# Patient Record
Sex: MALE | Race: WHITE | NOT HISPANIC OR LATINO | Employment: FULL TIME | URBAN - METROPOLITAN AREA
[De-identification: names, ages, dates, MRNs, and addresses within clinical notes are randomized per-mention and may not be internally consistent; named-entity substitution may affect disease eponyms.]

---

## 2018-08-17 ENCOUNTER — OFFICE VISIT (OUTPATIENT)
Dept: INTERNAL MEDICINE | Facility: CLINIC | Age: 28
End: 2018-08-17
Payer: OTHER GOVERNMENT

## 2018-08-17 VITALS
SYSTOLIC BLOOD PRESSURE: 136 MMHG | HEIGHT: 68 IN | DIASTOLIC BLOOD PRESSURE: 80 MMHG | BODY MASS INDEX: 25.11 KG/M2 | WEIGHT: 165.69 LBS | TEMPERATURE: 97 F | RESPIRATION RATE: 16 BRPM | HEART RATE: 68 BPM

## 2018-08-17 DIAGNOSIS — Z00.00 ROUTINE GENERAL MEDICAL EXAMINATION AT A HEALTH CARE FACILITY: Primary | ICD-10-CM

## 2018-08-17 DIAGNOSIS — J45.40 MODERATE PERSISTENT ASTHMA, UNSPECIFIED WHETHER COMPLICATED: ICD-10-CM

## 2018-08-17 DIAGNOSIS — J30.81 ALLERGIC RHINITIS DUE TO ANIMAL HAIR AND DANDER: ICD-10-CM

## 2018-08-17 PROCEDURE — 99999 PR PBB SHADOW E&M-NEW PATIENT-LVL III: CPT | Mod: PBBFAC,,, | Performed by: INTERNAL MEDICINE

## 2018-08-17 PROCEDURE — 99203 OFFICE O/P NEW LOW 30 MIN: CPT | Mod: PBBFAC,PN | Performed by: INTERNAL MEDICINE

## 2018-08-17 PROCEDURE — 99385 PREV VISIT NEW AGE 18-39: CPT | Mod: S$PBB,,, | Performed by: INTERNAL MEDICINE

## 2018-08-17 RX ORDER — DEXAMETHASONE 4 MG/1
TABLET ORAL
Refills: 0 | COMMUNITY
Start: 2018-06-16 | End: 2018-08-17 | Stop reason: SDUPTHER

## 2018-08-17 RX ORDER — DIPHENHYDRAMINE HCL 25 MG
25 TABLET ORAL NIGHTLY PRN
COMMUNITY

## 2018-08-17 RX ORDER — FLUTICASONE PROPIONATE 50 MCG
SPRAY, SUSPENSION (ML) NASAL
Refills: 12 | COMMUNITY
Start: 2018-06-16 | End: 2018-08-17 | Stop reason: SDUPTHER

## 2018-08-17 RX ORDER — FLUTICASONE PROPIONATE 50 MCG
SPRAY, SUSPENSION (ML) NASAL
Qty: 3 BOTTLE | Refills: 3 | Status: SHIPPED | OUTPATIENT
Start: 2018-08-17 | End: 2018-11-02 | Stop reason: SDUPTHER

## 2018-08-17 RX ORDER — ASPIRIN 500 MG
500 TABLET, DELAYED RELEASE (ENTERIC COATED) ORAL EVERY 6 HOURS PRN
COMMUNITY

## 2018-08-17 RX ORDER — ALBUTEROL SULFATE 90 UG/1
AEROSOL, METERED RESPIRATORY (INHALATION)
COMMUNITY
Start: 2018-06-09 | End: 2018-08-17 | Stop reason: SDUPTHER

## 2018-08-17 RX ORDER — ALBUTEROL SULFATE 90 UG/1
2 AEROSOL, METERED RESPIRATORY (INHALATION) EVERY 6 HOURS PRN
Qty: 18 G | Refills: 3 | Status: SHIPPED | OUTPATIENT
Start: 2018-08-17 | End: 2018-11-02 | Stop reason: SDUPTHER

## 2018-08-17 RX ORDER — DEXAMETHASONE 4 MG/1
TABLET ORAL
Qty: 36 G | Refills: 3 | Status: SHIPPED | OUTPATIENT
Start: 2018-08-17 | End: 2018-10-22 | Stop reason: SDUPTHER

## 2018-08-17 NOTE — PROGRESS NOTES
Subjective:       Patient ID: Aria Jacobo is a 28 y.o. male.    Chief Complaint: Establish Care    HPI  Pt establishing care.  Has a hx of asthma exac by animaldander.  Rare use of rescue inhaler.  Got immunizations thru [employer].  Review of Systems   All other systems reviewed and are negative.      Objective:      Physical Exam   Constitutional: He appears well-developed. No distress.   HENT:   Head: Normocephalic.   Eyes: EOM are normal. No scleral icterus.   Neck: Normal range of motion. No tracheal deviation present.   Cardiovascular: Normal rate, regular rhythm, normal heart sounds and intact distal pulses.   Pulmonary/Chest: Effort normal and breath sounds normal. No respiratory distress.   Abdominal: Soft. Bowel sounds are normal. He exhibits no distension. There is no tenderness.   Musculoskeletal: Normal range of motion. He exhibits no edema.   Neurological: He is alert.   Skin: Skin is warm and dry. No rash noted. He is not diaphoretic. No erythema.   Psychiatric: He has a normal mood and affect. His behavior is normal.   Vitals reviewed.      Assessment:       1. Routine general medical examination at a health care facility    2. Moderate persistent asthma, unspecified whether complicated    3. Allergic rhinitis due to animal hair and dander        Plan:       Aria was seen today for establish care.    Diagnoses and all orders for this visit:    Routine general medical examination at a health care facility    Moderate persistent asthma, unspecified whether complicated  -     PROAIR HFA 90 mcg/actuation inhaler; Inhale 2 puffs into the lungs every 6 (six) hours as needed for Wheezing.  -     FLOVENT  mcg/actuation inhaler; INL 2 TO 3 PFS PO BID    Allergic rhinitis due to animal hair and dander  -     fluticasone (FLONASE) 50 mcg/actuation nasal spray; 2 PUFFS INTO NOSTRILS QD      Follow-up if symptoms worsen or fail to improve.

## 2018-10-22 DIAGNOSIS — J45.40 MODERATE PERSISTENT ASTHMA, UNSPECIFIED WHETHER COMPLICATED: ICD-10-CM

## 2018-10-23 RX ORDER — DEXAMETHASONE 4 MG/1
TABLET ORAL
Qty: 36 G | Refills: 3 | Status: SHIPPED | OUTPATIENT
Start: 2018-10-23 | End: 2018-10-25 | Stop reason: SDUPTHER

## 2018-10-25 DIAGNOSIS — J45.40 MODERATE PERSISTENT ASTHMA, UNSPECIFIED WHETHER COMPLICATED: ICD-10-CM

## 2018-10-25 RX ORDER — DEXAMETHASONE 4 MG/1
TABLET ORAL
Qty: 36 G | Refills: 3 | Status: SHIPPED | OUTPATIENT
Start: 2018-10-25 | End: 2018-11-02 | Stop reason: SDUPTHER

## 2018-10-26 ENCOUNTER — TELEPHONE (OUTPATIENT)
Dept: INTERNAL MEDICINE | Facility: CLINIC | Age: 28
End: 2018-10-26

## 2018-10-26 NOTE — TELEPHONE ENCOUNTER
----- Message from Halima Alston sent at 10/26/2018 10:43 AM CDT -----  Contact: [place] request  ----- Message from [employer], System Message sent at 10/26/2018  9:44 AM CDT -----    Appointment Request From: Aria Jacobo    With Provider: Manan Carrasquillo MD [[facility] - Internal Medicine]    Preferred Date Range: 11/2/2018 - 11/7/2018    Preferred Times: Any time    Reason for visit: Deployment readiness    Comments:  Need to exchange medical info, need pre-deployment list accomplished

## 2018-11-02 ENCOUNTER — OFFICE VISIT (OUTPATIENT)
Dept: INTERNAL MEDICINE | Facility: CLINIC | Age: 28
End: 2018-11-02
Payer: OTHER GOVERNMENT

## 2018-11-02 VITALS
DIASTOLIC BLOOD PRESSURE: 80 MMHG | TEMPERATURE: 97 F | WEIGHT: 170.88 LBS | SYSTOLIC BLOOD PRESSURE: 118 MMHG | HEART RATE: 81 BPM | HEIGHT: 69 IN | OXYGEN SATURATION: 97 % | BODY MASS INDEX: 25.31 KG/M2

## 2018-11-02 DIAGNOSIS — J45.40 MODERATE PERSISTENT ASTHMA, UNSPECIFIED WHETHER COMPLICATED: ICD-10-CM

## 2018-11-02 DIAGNOSIS — J30.81 ALLERGIC RHINITIS DUE TO ANIMAL HAIR AND DANDER: ICD-10-CM

## 2018-11-02 PROCEDURE — 99214 OFFICE O/P EST MOD 30 MIN: CPT | Mod: PBBFAC,PN | Performed by: INTERNAL MEDICINE

## 2018-11-02 PROCEDURE — 99213 OFFICE O/P EST LOW 20 MIN: CPT | Mod: S$PBB,,, | Performed by: INTERNAL MEDICINE

## 2018-11-02 PROCEDURE — 99999 PR PBB SHADOW E&M-EST. PATIENT-LVL IV: CPT | Mod: PBBFAC,,, | Performed by: INTERNAL MEDICINE

## 2018-11-02 RX ORDER — ALBUTEROL SULFATE 90 UG/1
2 AEROSOL, METERED RESPIRATORY (INHALATION) EVERY 6 HOURS PRN
Qty: 18 G | Refills: 3 | Status: SHIPPED | OUTPATIENT
Start: 2018-11-02 | End: 2020-03-11 | Stop reason: SDUPTHER

## 2018-11-02 RX ORDER — DEXAMETHASONE 4 MG/1
TABLET ORAL
Qty: 36 G | Refills: 3 | Status: SHIPPED | OUTPATIENT
Start: 2018-11-02 | End: 2018-11-09 | Stop reason: SDUPTHER

## 2018-11-02 RX ORDER — FLUTICASONE PROPIONATE 50 MCG
SPRAY, SUSPENSION (ML) NASAL
Qty: 3 BOTTLE | Refills: 3 | Status: SHIPPED | OUTPATIENT
Start: 2018-11-02 | End: 2019-07-03 | Stop reason: SDUPTHER

## 2018-11-02 NOTE — PROGRESS NOTES
Subjective:       Patient ID: Aria Jacobo is a 28 y.o. male.    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  No asthma flare in almost 1 year.  Review of Systems    Objective:      Physical Exam   Constitutional: He appears well-developed. No distress.   HENT:   Head: Normocephalic.   Eyes: EOM are normal. No scleral icterus.   Neck: Normal range of motion. No tracheal deviation present.   Cardiovascular: Normal rate, regular rhythm, normal heart sounds and intact distal pulses.   Pulmonary/Chest: Effort normal and breath sounds normal. No respiratory distress.   Abdominal: He exhibits no distension.   Musculoskeletal: Normal range of motion. He exhibits no edema.   Neurological: He is alert.   Skin: Skin is warm and dry. No rash noted. He is not diaphoretic. No erythema.   Psychiatric: He has a normal mood and affect. His behavior is normal.   Vitals reviewed.      Assessment:       1. Moderate persistent asthma, unspecified whether complicated    2. Allergic rhinitis due to animal hair and dander        Plan:       Aria was seen today for follow-up.    Diagnoses and all orders for this visit:    Moderate persistent asthma, unspecified whether complicated  -     PROAIR HFA 90 mcg/actuation inhaler; Inhale 2 puffs into the lungs every 6 (six) hours as needed for Wheezing.  -     FLOVENT  mcg/actuation inhaler; INL 2 TO 3 PFS PO BID    Allergic rhinitis due to animal hair and dander  -     fluticasone (FLONASE) 50 mcg/actuation nasal spray; 2 PUFFS INTO NOSTRILS QD      Follow-up if symptoms worsen or fail to improve.

## 2018-11-09 DIAGNOSIS — J45.40 MODERATE PERSISTENT ASTHMA, UNSPECIFIED WHETHER COMPLICATED: ICD-10-CM

## 2018-11-09 RX ORDER — DEXAMETHASONE 4 MG/1
TABLET ORAL
Qty: 36 G | Refills: 3 | Status: SHIPPED | OUTPATIENT
Start: 2018-11-09 | End: 2018-11-09 | Stop reason: SDUPTHER

## 2018-11-09 NOTE — TELEPHONE ENCOUNTER
----- Message from Gloria Valdovinos sent at 11/9/2018  3:19 PM CST -----  Haven't  Received his Flovent from [facility] yet and he is out of his Flovent . Want to know can he get a 30 day supply for Flovent ( emergency script). Please send to [facility] in [place].

## 2018-11-12 ENCOUNTER — TELEPHONE (OUTPATIENT)
Dept: INTERNAL MEDICINE | Facility: CLINIC | Age: 28
End: 2018-11-12

## 2018-11-12 DIAGNOSIS — Z00.00 ROUTINE GENERAL MEDICAL EXAMINATION AT A HEALTH CARE FACILITY: Primary | ICD-10-CM

## 2018-11-12 RX ORDER — DEXAMETHASONE 4 MG/1
TABLET ORAL
Qty: 36 G | Refills: 3 | Status: SHIPPED | OUTPATIENT
Start: 2018-11-12 | End: 2020-05-12 | Stop reason: SDUPTHER

## 2018-11-12 NOTE — TELEPHONE ENCOUNTER
----- Message from Santiago Hutchinson sent at 11/12/2018  3:45 PM CST -----  Contact: [phone]/self  Patient states he's returning your call   Please call and advise

## 2018-11-14 ENCOUNTER — CLINICAL SUPPORT (OUTPATIENT)
Dept: INTERNAL MEDICINE | Facility: CLINIC | Age: 28
End: 2018-11-14
Payer: OTHER GOVERNMENT

## 2018-11-14 DIAGNOSIS — Z00.00 ROUTINE GENERAL MEDICAL EXAMINATION AT A HEALTH CARE FACILITY: ICD-10-CM

## 2018-11-14 PROCEDURE — 94010 BREATHING CAPACITY TEST: CPT | Mod: PBBFAC

## 2018-11-14 PROCEDURE — 94010 BREATHING CAPACITY TEST: CPT | Mod: 26,S$PBB,, | Performed by: INTERNAL MEDICINE

## 2018-11-15 NOTE — PROGRESS NOTES
Aria Jacobo presents on 11/14/18 for pulmonary function test. Completed with no problems. Dr Carrasquillo reviewed and results were scanned into the chart.

## 2018-11-21 ENCOUNTER — TELEPHONE (OUTPATIENT)
Dept: INTERNAL MEDICINE | Facility: CLINIC | Age: 28
End: 2018-11-21

## 2018-11-21 NOTE — TELEPHONE ENCOUNTER
Form emailed to [email or web address]      ----- Message from Danyelle Toussaint sent at 11/21/2018  8:59 AM CST -----  Contact: Self [phone]  Patient is calling to see if his paper work has been faxed. Please advice

## 2018-11-27 ENCOUNTER — TELEPHONE (OUTPATIENT)
Dept: INTERNAL MEDICINE | Facility: CLINIC | Age: 28
End: 2018-11-27

## 2018-11-27 NOTE — TELEPHONE ENCOUNTER
Spoke with patient and he is calling to see if paperwork was emailed. Informed will talk with staff and call back.

## 2018-11-27 NOTE — TELEPHONE ENCOUNTER
----- Message from Maria Jensen sent at 11/27/2018  9:50 AM CST -----  Contact: [phone]/ self   Pt its requesting to speak with the nurse in regarding his paper work for [employer]. Please advise

## 2019-07-03 DIAGNOSIS — J30.81 ALLERGIC RHINITIS DUE TO ANIMAL HAIR AND DANDER: ICD-10-CM

## 2019-07-05 ENCOUNTER — TELEPHONE (OUTPATIENT)
Dept: FAMILY MEDICINE | Facility: CLINIC | Age: 29
End: 2019-07-05

## 2019-07-05 DIAGNOSIS — J30.81 ALLERGIC RHINITIS DUE TO ANIMAL HAIR AND DANDER: ICD-10-CM

## 2019-07-05 RX ORDER — FLUTICASONE PROPIONATE 50 MCG
SPRAY, SUSPENSION (ML) NASAL
Qty: 3 BOTTLE | Refills: 3 | Status: SHIPPED | OUTPATIENT
Start: 2019-07-05 | End: 2019-07-06 | Stop reason: SDUPTHER

## 2019-07-05 RX ORDER — FLUTICASONE PROPIONATE 50 MCG
SPRAY, SUSPENSION (ML) NASAL
Qty: 3 BOTTLE | Refills: 3 | Status: SHIPPED | OUTPATIENT
Start: 2019-07-05 | End: 2019-07-05 | Stop reason: SDUPTHER

## 2019-07-06 DIAGNOSIS — J30.81 ALLERGIC RHINITIS DUE TO ANIMAL HAIR AND DANDER: ICD-10-CM

## 2019-07-06 RX ORDER — FLUTICASONE PROPIONATE 50 MCG
SPRAY, SUSPENSION (ML) NASAL
Qty: 3 BOTTLE | Refills: 3 | Status: SHIPPED | OUTPATIENT
Start: 2019-07-06 | End: 2020-12-17

## 2019-07-09 ENCOUNTER — PATIENT MESSAGE (OUTPATIENT)
Dept: INTERNAL MEDICINE | Facility: CLINIC | Age: 29
End: 2019-07-09

## 2019-07-10 DIAGNOSIS — J30.81 ALLERGIC RHINITIS DUE TO ANIMAL HAIR AND DANDER: ICD-10-CM

## 2019-09-10 ENCOUNTER — OFFICE VISIT (OUTPATIENT)
Dept: INTERNAL MEDICINE | Facility: CLINIC | Age: 29
End: 2019-09-10
Payer: OTHER GOVERNMENT

## 2019-09-10 VITALS — OXYGEN SATURATION: 96 % | WEIGHT: 168.88 LBS | HEIGHT: 69 IN | HEART RATE: 72 BPM | BODY MASS INDEX: 25.01 KG/M2

## 2019-09-10 DIAGNOSIS — Z00.00 ROUTINE GENERAL MEDICAL EXAMINATION AT A HEALTH CARE FACILITY: Primary | ICD-10-CM

## 2019-09-10 DIAGNOSIS — J45.40 MODERATE PERSISTENT ASTHMA, UNSPECIFIED WHETHER COMPLICATED: ICD-10-CM

## 2019-09-10 PROCEDURE — 99213 OFFICE O/P EST LOW 20 MIN: CPT | Mod: PBBFAC,PN | Performed by: INTERNAL MEDICINE

## 2019-09-10 PROCEDURE — 99999 PR PBB SHADOW E&M-EST. PATIENT-LVL III: CPT | Mod: PBBFAC,,, | Performed by: INTERNAL MEDICINE

## 2019-09-10 PROCEDURE — 99999 PR PBB SHADOW E&M-EST. PATIENT-LVL III: ICD-10-PCS | Mod: PBBFAC,,, | Performed by: INTERNAL MEDICINE

## 2019-09-10 PROCEDURE — 99395 PR PREVENTIVE VISIT,EST,18-39: ICD-10-PCS | Mod: S$PBB,,, | Performed by: INTERNAL MEDICINE

## 2019-09-10 PROCEDURE — 99395 PREV VISIT EST AGE 18-39: CPT | Mod: S$PBB,,, | Performed by: INTERNAL MEDICINE

## 2019-09-10 NOTE — PROGRESS NOTES
Subjective:       Patient ID: Aria Jacobo is a 29 y.o. male.    Chief Complaint: Follow-up    HPI  Wellness check.  Chart reviewed.  Pt needs asthma documentation for [employer].  No recent flairs, last use of MDI 1 month ago due to animal dander.  No exercise induced sxs.  Review of Systems   Constitutional: Negative for activity change and unexpected weight change.   HENT: Negative for hearing loss, rhinorrhea and trouble swallowing.    Eyes: Negative for discharge and visual disturbance.   Respiratory: Negative for chest tightness and wheezing.    Cardiovascular: Negative for chest pain and palpitations.   Gastrointestinal: Negative for blood in stool, constipation, diarrhea and vomiting.   Endocrine: Negative for polydipsia and polyuria.   Genitourinary: Negative for difficulty urinating, hematuria and urgency.   Musculoskeletal: Negative for arthralgias, joint swelling and neck pain.   Neurological: Negative for weakness and headaches.   Psychiatric/Behavioral: Negative for confusion and dysphoric mood.   All other systems reviewed and are negative.      Objective:      Physical Exam   Constitutional: He appears well-developed.   HENT:   Head: Normocephalic.   Mouth/Throat: Oropharynx is clear and moist.   Eyes: EOM are normal.   Neck: Normal range of motion.   Cardiovascular: Normal rate, regular rhythm, normal heart sounds and intact distal pulses.   Pulmonary/Chest: Effort normal and breath sounds normal. He has no wheezes.   Abdominal: Bowel sounds are normal.   Musculoskeletal: Normal range of motion.   Neurological: He is alert.   Skin: Skin is warm and dry.   Psychiatric: He has a normal mood and affect.   Vitals reviewed.      Assessment:       1. Routine general medical examination at a health care facility    2. Moderate persistent asthma, unspecified whether complicated        Plan:       Aira was seen today for follow-up.    Diagnoses and all orders for this visit:    Routine general medical  examination at a health care facility    Moderate persistent asthma, unspecified whether complicated  -     Primary Care Spirometry w/o Bronchodilator  -     Complete PFT with bronchodilator; Future      Follow up if symptoms worsen or fail to improve.

## 2019-09-12 ENCOUNTER — TELEPHONE (OUTPATIENT)
Dept: INTERNAL MEDICINE | Facility: CLINIC | Age: 29
End: 2019-09-12

## 2019-09-19 ENCOUNTER — HOSPITAL ENCOUNTER (OUTPATIENT)
Dept: PULMONOLOGY | Facility: HOSPITAL | Age: 29
Discharge: HOME | End: 2019-09-19
Attending: INTERNAL MEDICINE
Payer: OTHER GOVERNMENT

## 2019-09-19 DIAGNOSIS — J45.40 MODERATE PERSISTENT ASTHMA, UNSPECIFIED WHETHER COMPLICATED: ICD-10-CM

## 2019-09-19 PROCEDURE — 94640 AIRWAY INHALATION TREATMENT: CPT | Mod: 59

## 2019-09-19 PROCEDURE — 94729 DIFFUSING CAPACITY: CPT

## 2019-09-19 PROCEDURE — 94010 BREATHING CAPACITY TEST: CPT

## 2019-09-19 PROCEDURE — 94726 PLETHYSMOGRAPHY LUNG VOLUMES: CPT

## 2019-09-23 LAB
BRPFT: ABNORMAL
DLCO ADJ PRE: 33.07 ML/(MIN*MMHG) (ref 27.21–41.06)
DLCO SINGLE BREATH LLN: 27.21
DLCO SINGLE BREATH PRE REF: 96.9 %
DLCO SINGLE BREATH REF: 34.14
DLCOC SBVA LLN: 3.64
DLCOC SBVA PRE REF: 110 %
DLCOC SBVA REF: 4.95
DLCOC SINGLE BREATH LLN: 27.21
DLCOC SINGLE BREATH PRE REF: 96.9 %
DLCOC SINGLE BREATH REF: 34.14
DLCOVA LLN: 3.64
DLCOVA PRE REF: 110 %
DLCOVA PRE: 5.44 ML/(MIN*MMHG*L) (ref 3.64–6.25)
DLCOVA REF: 4.95
DLVAADJ PRE: 5.44 ML/(MIN*MMHG*L) (ref 3.64–6.25)
ERV LLN: 1.55
ERV PRE REF: 37.4 %
ERV REF: 1.55
FEF 25 75 CHG: 29.6 %
FEF 25 75 LLN: 2.76
FEF 25 75 POST REF: 42.9 %
FEF 25 75 PRE REF: 33.1 %
FEF 25 75 REF: 4.44
FET100 CHG: 27 %
FEV1 CHG: 25.3 %
FEV1 FVC CHG: 11.5 %
FEV1 FVC LLN: 72
FEV1 FVC POST REF: 77.1 %
FEV1 FVC PRE REF: 69.2 %
FEV1 FVC REF: 83
FEV1 LLN: 3.46
FEV1 POST REF: 71.7 %
FEV1 PRE REF: 57.3 %
FEV1 REF: 4.33
FRCPLETH LLN: 2.29
FRCPLETH PREREF: 113.6 %
FRCPLETH REF: 3.27
FVC CHG: 12.4 %
FVC LLN: 4.22
FVC POST REF: 92.6 %
FVC PRE REF: 82.4 %
FVC REF: 5.24
IVC PRE: 4.53 L (ref 4.22–6.26)
IVC SINGLE BREATH LLN: 4.22
IVC SINGLE BREATH PRE REF: 86.3 %
IVC SINGLE BREATH REF: 5.24
PEF CHG: 20.2 %
PEF LLN: 7.76
PEF POST REF: 96.4 %
PEF PRE REF: 80.2 %
PEF REF: 10.01
POST FEF 25 75: 1.91 L/S (ref 2.76–6.12)
POST FET 100: 7.05 SEC
POST FEV1 FVC: 63.98 % (ref 72.01–93.95)
POST FEV1: 3.11 L (ref 3.46–5.2)
POST FVC: 4.86 L (ref 4.22–6.26)
POST PEF: 9.65 L/S (ref 7.76–12.25)
PRE DLCO: 33.07 ML/(MIN*MMHG) (ref 27.21–41.06)
PRE ERV: 0.58 L (ref 1.55–1.55)
PRE FEF 25 75: 1.47 L/S (ref 2.76–6.12)
PRE FET 100: 5.55 SEC
PRE FEV1 FVC: 57.41 % (ref 72.01–93.95)
PRE FEV1: 2.48 L (ref 3.46–5.2)
PRE FRC PL: 3.72 L
PRE FVC: 4.32 L (ref 4.22–6.26)
PRE PEF: 8.03 L/S (ref 7.76–12.25)
PRE RV: 1.92 L (ref 1.05–2.4)
PRE TLC: 6.69 L (ref 5.75–8.05)
RAW LLN: 3.06
RAW PRE REF: 93.6 %
RAW PRE: 2.86 CMH2O*S/L (ref 3.06–3.06)
RAW REF: 3.06
RV LLN: 1.05
RV PRE REF: 111.5 %
RV REF: 1.72
RVTLC LLN: 17
RVTLC PRE REF: 111.8 %
RVTLC PRE: 28.7 % (ref 16.68–34.64)
RVTLC REF: 26
TLC LLN: 5.75
TLC PRE REF: 96.9 %
TLC REF: 6.9
VA PRE: 6.08 L (ref 6.75–6.75)
VA SINGLE BREATH LLN: 6.75
VA SINGLE BREATH PRE REF: 90 %
VA SINGLE BREATH REF: 6.75
VC LLN: 4.22
VC PRE REF: 83.4 %
VC PRE: 4.37 L (ref 4.22–6.26)
VC REF: 5.24
VTGRAWPRE: 4.67 L

## 2019-09-27 ENCOUNTER — TELEPHONE (OUTPATIENT)
Dept: FAMILY MEDICINE | Facility: CLINIC | Age: 29
End: 2019-09-27

## 2019-09-27 ENCOUNTER — PATIENT MESSAGE (OUTPATIENT)
Dept: INTERNAL MEDICINE | Facility: CLINIC | Age: 29
End: 2019-09-27

## 2019-09-27 ENCOUNTER — HOSPITAL ENCOUNTER (OUTPATIENT)
Dept: PULMONOLOGY | Facility: HOSPITAL | Age: 29
Discharge: HOME | End: 2019-09-27
Attending: INTERNAL MEDICINE
Payer: OTHER GOVERNMENT

## 2019-09-27 DIAGNOSIS — J45.40 MODERATE PERSISTENT ASTHMA, UNSPECIFIED WHETHER COMPLICATED: Primary | ICD-10-CM

## 2019-09-27 DIAGNOSIS — J45.40 MODERATE PERSISTENT ASTHMA, UNSPECIFIED WHETHER COMPLICATED: ICD-10-CM

## 2019-09-27 PROCEDURE — 94010 BREATHING CAPACITY TEST: CPT

## 2019-09-27 PROCEDURE — 94726 PLETHYSMOGRAPHY LUNG VOLUMES: CPT

## 2019-09-27 PROCEDURE — 94640 AIRWAY INHALATION TREATMENT: CPT

## 2019-09-27 PROCEDURE — 94729 DIFFUSING CAPACITY: CPT

## 2019-09-30 LAB
BRPFT: ABNORMAL
BRPFT: ABNORMAL
DLCO ADJ PRE: 33.6 ML/(MIN*MMHG) (ref 27.21–41.06)
DLCO SINGLE BREATH LLN: 27.21
DLCO SINGLE BREATH PRE REF: 98.4 %
DLCO SINGLE BREATH REF: 34.14
DLCOC SBVA LLN: 3.64
DLCOC SBVA PRE REF: 108.9 %
DLCOC SBVA REF: 4.95
DLCOC SINGLE BREATH LLN: 27.21
DLCOC SINGLE BREATH PRE REF: 98.4 %
DLCOC SINGLE BREATH REF: 34.14
DLCOVA LLN: 3.64
DLCOVA PRE REF: 108.9 %
DLCOVA PRE: 5.39 ML/(MIN*MMHG*L) (ref 3.64–6.25)
DLCOVA REF: 4.95
DLVAADJ PRE: 5.39 ML/(MIN*MMHG*L) (ref 3.64–6.25)
ERV LLN: 1.55
ERV PRE REF: 81 %
ERV REF: 1.55
FEF 25 75 CHG: -8.6 %
FEF 25 75 LLN: 2.76
FEF 25 75 POST REF: 39.2 %
FEF 25 75 PRE REF: 42.9 %
FEF 25 75 REF: 4.44
FET100 CHG: 0 %
FEV1 CHG: 0.1 %
FEV1 FVC CHG: -6.6 %
FEV1 FVC LLN: 72
FEV1 FVC POST REF: 75.8 %
FEV1 FVC PRE REF: 81.1 %
FEV1 FVC REF: 83
FEV1 LLN: 3.46
FEV1 POST REF: 70.7 %
FEV1 PRE REF: 70.6 %
FEV1 REF: 4.33
FRCPLETH LLN: 2.29
FRCPLETH PREREF: 105 %
FRCPLETH REF: 3.27
FVC CHG: 7.2 %
FVC LLN: 4.22
FVC POST REF: 92.9 %
FVC PRE REF: 86.7 %
FVC REF: 5.24
IVC PRE: 4.64 L (ref 4.22–6.26)
IVC SINGLE BREATH LLN: 4.22
IVC SINGLE BREATH PRE REF: 88.5 %
IVC SINGLE BREATH REF: 5.24
PEF CHG: 2.8 %
PEF LLN: 7.76
PEF POST REF: 97.5 %
PEF PRE REF: 94.8 %
PEF REF: 10.01
POST FEF 25 75: 1.74 L/S (ref 2.76–6.12)
POST FET 100: 7.42 SEC
POST FEV1 FVC: 62.87 % (ref 72.01–93.94)
POST FEV1: 3.06 L (ref 3.46–5.2)
POST FVC: 4.87 L (ref 4.22–6.26)
POST PEF: 9.75 L/S (ref 7.76–12.25)
PRE DLCO: 33.6 ML/(MIN*MMHG) (ref 27.21–41.06)
PRE ERV: 1.26 L (ref 1.55–1.55)
PRE FEF 25 75: 1.91 L/S (ref 2.76–6.12)
PRE FET 100: 7.42 SEC
PRE FEV1 FVC: 67.28 % (ref 72.01–93.94)
PRE FEV1: 3.06 L (ref 3.46–5.2)
PRE FRC PL: 3.44 L
PRE FVC: 4.54 L (ref 4.22–6.26)
PRE PEF: 9.49 L/S (ref 7.76–12.25)
PRE RV: 1.73 L (ref 1.05–2.4)
PRE TLC: 6.33 L (ref 5.75–8.05)
RAW LLN: 3.06
RAW PRE REF: 74.1 %
RAW PRE: 2.27 CMH2O*S/L (ref 3.06–3.06)
RAW REF: 3.06
RV LLN: 1.05
RV PRE REF: 100.3 %
RV REF: 1.72
RVTLC LLN: 17
RVTLC PRE REF: 106.4 %
RVTLC PRE: 27.29 % (ref 16.68–34.64)
RVTLC REF: 26
TLC LLN: 5.75
TLC PRE REF: 91.7 %
TLC REF: 6.9
VA PRE: 6.25 L (ref 6.75–6.75)
VA SINGLE BREATH LLN: 6.75
VA SINGLE BREATH PRE REF: 92.6 %
VA SINGLE BREATH REF: 6.75
VC LLN: 4.22
VC PRE REF: 87.8 %
VC PRE: 4.6 L (ref 4.22–6.26)
VC REF: 5.24
VTGRAWPRE: 4.07 L

## 2019-10-04 ENCOUNTER — HOSPITAL ENCOUNTER (OUTPATIENT)
Dept: PULMONOLOGY | Facility: HOSPITAL | Age: 29
Discharge: HOME | End: 2019-10-04
Attending: INTERNAL MEDICINE
Payer: OTHER GOVERNMENT

## 2019-10-04 DIAGNOSIS — J45.40 MODERATE PERSISTENT ASTHMA, UNSPECIFIED WHETHER COMPLICATED: ICD-10-CM

## 2019-10-04 PROCEDURE — 94010 BREATHING CAPACITY TEST: CPT

## 2019-10-04 PROCEDURE — 94729 DIFFUSING CAPACITY: CPT

## 2019-10-04 PROCEDURE — 94726 PLETHYSMOGRAPHY LUNG VOLUMES: CPT

## 2019-10-04 PROCEDURE — 94640 AIRWAY INHALATION TREATMENT: CPT | Mod: 59

## 2019-10-05 LAB
BRPFT: ABNORMAL
DLCO ADJ PRE: 31.97 ML/(MIN*MMHG) (ref 27.21–41.06)
DLCO SINGLE BREATH LLN: 27.21
DLCO SINGLE BREATH PRE REF: 93.6 %
DLCO SINGLE BREATH REF: 34.14
DLCOC SBVA LLN: 3.64
DLCOC SBVA PRE REF: 104.8 %
DLCOC SBVA REF: 4.95
DLCOC SINGLE BREATH LLN: 27.21
DLCOC SINGLE BREATH PRE REF: 93.6 %
DLCOC SINGLE BREATH REF: 34.14
DLCOVA LLN: 3.64
DLCOVA PRE REF: 104.8 %
DLCOVA PRE: 5.19 ML/(MIN*MMHG*L) (ref 3.64–6.25)
DLCOVA REF: 4.95
DLVAADJ PRE: 5.19 ML/(MIN*MMHG*L) (ref 3.64–6.25)
ERV LLN: 1.55
ERV PRE REF: 53.5 %
ERV REF: 1.55
FEF 25 75 CHG: 21.6 %
FEF 25 75 LLN: 2.81
FEF 25 75 POST REF: 36.6 %
FEF 25 75 PRE REF: 30.1 %
FEF 25 75 REF: 5.08
FET100 CHG: 10.9 %
FEV1 CHG: 21.7 %
FEV1 FVC CHG: 3.6 %
FEV1 FVC LLN: 72
FEV1 FVC POST REF: 77 %
FEV1 FVC PRE REF: 74.3 %
FEV1 FVC REF: 83
FEV1 LLN: 3.46
FEV1 POST REF: 74.5 %
FEV1 PRE REF: 61.2 %
FEV1 REF: 4.33
FRCPLETH LLN: 2.29
FRCPLETH PREREF: 118.2 %
FRCPLETH REF: 3.27
FVC CHG: 17.5 %
FVC LLN: 4.22
FVC POST REF: 96.4 %
FVC PRE REF: 82 %
FVC REF: 5.24
IVC PRE: 4.5 L (ref 4.22–6.26)
IVC SINGLE BREATH LLN: 4.22
IVC SINGLE BREATH PRE REF: 85.8 %
IVC SINGLE BREATH REF: 5.24
PEF CHG: 15.6 %
PEF LLN: 7.76
PEF POST REF: 100.9 %
PEF PRE REF: 87.2 %
PEF REF: 10.01
POST FEF 25 75: 1.86 L/S (ref 2.81–7.35)
POST FET 100: 6.98 SEC
POST FEV1 FVC: 63.85 % (ref 72.01–93.94)
POST FEV1: 3.23 L (ref 3.46–5.2)
POST FVC: 5.05 L (ref 4.22–6.26)
POST PEF: 10.1 L/S (ref 7.76–12.25)
PRE DLCO: 31.97 ML/(MIN*MMHG) (ref 27.21–41.06)
PRE ERV: 0.83 L (ref 1.55–1.55)
PRE FEF 25 75: 1.53 L/S (ref 2.81–7.35)
PRE FET 100: 6.3 SEC
PRE FEV1 FVC: 61.63 % (ref 72.01–93.94)
PRE FEV1: 2.65 L (ref 3.46–5.2)
PRE FRC PL: 3.87 L
PRE FVC: 4.3 L (ref 4.22–6.26)
PRE PEF: 8.73 L/S (ref 7.76–12.25)
PRE RV: 2.12 L (ref 1.05–2.4)
PRE TLC: 6.92 L (ref 5.75–8.05)
RAW LLN: 3.06
RAW PRE REF: 87.1 %
RAW PRE: 2.66 CMH2O*S/L (ref 3.06–3.06)
RAW REF: 3.06
RV LLN: 1.05
RV PRE REF: 123.1 %
RV REF: 1.72
RVTLC LLN: 17
RVTLC PRE REF: 119.4 %
RVTLC PRE: 30.64 % (ref 16.68–34.64)
RVTLC REF: 26
TLC LLN: 5.75
TLC PRE REF: 100.3 %
TLC REF: 6.9
VA PRE: 6.17 L (ref 6.75–6.75)
VA SINGLE BREATH LLN: 6.75
VA SINGLE BREATH PRE REF: 91.3 %
VA SINGLE BREATH REF: 6.75
VC LLN: 4.22
VC PRE REF: 82 %
VC PRE: 4.3 L (ref 4.22–6.26)
VC REF: 5.24
VTGRAWPRE: 5 L

## 2020-03-11 DIAGNOSIS — J45.40 MODERATE PERSISTENT ASTHMA, UNSPECIFIED WHETHER COMPLICATED: ICD-10-CM

## 2020-03-12 DIAGNOSIS — J45.40 MODERATE PERSISTENT ASTHMA, UNSPECIFIED WHETHER COMPLICATED: ICD-10-CM

## 2020-03-12 RX ORDER — ALBUTEROL SULFATE 90 UG/1
2 AEROSOL, METERED RESPIRATORY (INHALATION) EVERY 6 HOURS PRN
Qty: 18 G | Refills: 4 | Status: SHIPPED | OUTPATIENT
Start: 2020-03-12 | End: 2021-07-07

## 2020-03-12 RX ORDER — ALBUTEROL SULFATE 90 UG/1
2 AEROSOL, METERED RESPIRATORY (INHALATION) EVERY 6 HOURS PRN
Qty: 18 G | Refills: 4 | Status: SHIPPED | OUTPATIENT
Start: 2020-03-12 | End: 2020-03-12 | Stop reason: SDUPTHER

## 2020-03-13 ENCOUNTER — OFFICE VISIT (OUTPATIENT)
Dept: INTERNAL MEDICINE | Facility: CLINIC | Age: 30
End: 2020-03-13
Payer: OTHER GOVERNMENT

## 2020-03-13 VITALS
BODY MASS INDEX: 23.22 KG/M2 | TEMPERATURE: 100 F | DIASTOLIC BLOOD PRESSURE: 80 MMHG | SYSTOLIC BLOOD PRESSURE: 110 MMHG | HEART RATE: 104 BPM | HEIGHT: 69 IN | OXYGEN SATURATION: 96 % | WEIGHT: 156.75 LBS

## 2020-03-13 DIAGNOSIS — J06.9 UPPER RESPIRATORY TRACT INFECTION, UNSPECIFIED TYPE: Primary | ICD-10-CM

## 2020-03-13 DIAGNOSIS — J45.40 MODERATE PERSISTENT ASTHMA, UNSPECIFIED WHETHER COMPLICATED: ICD-10-CM

## 2020-03-13 PROCEDURE — 99214 OFFICE O/P EST MOD 30 MIN: CPT | Mod: S$PBB,,, | Performed by: INTERNAL MEDICINE

## 2020-03-13 PROCEDURE — 99213 OFFICE O/P EST LOW 20 MIN: CPT | Mod: PBBFAC,PN | Performed by: INTERNAL MEDICINE

## 2020-03-13 PROCEDURE — 99999 PR PBB SHADOW E&M-EST. PATIENT-LVL III: ICD-10-PCS | Mod: PBBFAC,,, | Performed by: INTERNAL MEDICINE

## 2020-03-13 PROCEDURE — 99214 PR OFFICE/OUTPT VISIT, EST, LEVL IV, 30-39 MIN: ICD-10-PCS | Mod: S$PBB,,, | Performed by: INTERNAL MEDICINE

## 2020-03-13 PROCEDURE — 99999 PR PBB SHADOW E&M-EST. PATIENT-LVL III: CPT | Mod: PBBFAC,,, | Performed by: INTERNAL MEDICINE

## 2020-03-13 RX ORDER — METHYLPREDNISOLONE 4 MG/1
TABLET ORAL
Qty: 1 PACKAGE | Refills: 0 | Status: SHIPPED | OUTPATIENT
Start: 2020-03-13 | End: 2023-04-25 | Stop reason: ALTCHOICE

## 2020-03-13 RX ORDER — PROMETHAZINE HYDROCHLORIDE AND CODEINE PHOSPHATE 6.25; 1 MG/5ML; MG/5ML
5 SOLUTION ORAL EVERY 4 HOURS PRN
Qty: 120 ML | Refills: 0 | Status: SHIPPED | OUTPATIENT
Start: 2020-03-13 | End: 2020-03-23

## 2020-03-13 NOTE — PROGRESS NOTES
Subjective:       Patient ID: Aria Jacobo is a 29 y.o. male.    Chief Complaint: Sore Throat; Fever; and Cough    HPI  Pt with 2 days of coryza, nasal congestion, min prod cough, wheezing, low-grade temp.  No SOB, N/V/D.  Daughter recently dxed with strept throat.  Review of Systems   All other systems reviewed and are negative.      Objective:      Physical Exam   Constitutional: He appears well-developed.   HENT:   Head: Normocephalic.   Mouth/Throat: Oropharynx is clear and moist. No oropharyngeal exudate.   Eyes: EOM are normal.   Neck: Normal range of motion.   Cardiovascular: Normal rate, regular rhythm, normal heart sounds and intact distal pulses.   Pulmonary/Chest: Effort normal. He has wheezes (end-exp in bases).   Abdominal: Bowel sounds are normal.   Musculoskeletal: Normal range of motion.   Neurological: He is alert.   Skin: Skin is warm and dry.   Psychiatric: He has a normal mood and affect.   Vitals reviewed.      Assessment:       1. Upper respiratory tract infection, unspecified type    2. Moderate persistent asthma, unspecified whether complicated        Plan:       Aria was seen today for sore throat, fever and cough.    Diagnoses and all orders for this visit:    Upper respiratory tract infection, unspecified type  -     methylPREDNISolone (MEDROL DOSEPACK) 4 mg tablet; use as directed  -     promethazine-codeine 6.25-10 mg/5 ml (PHENERGAN WITH CODEINE) 6.25-10 mg/5 mL syrup; Take 5 mLs by mouth every 4 (four) hours as needed for Cough.    Moderate persistent asthma, unspecified whether complicated  -     methylPREDNISolone (MEDROL DOSEPACK) 4 mg tablet; use as directed      Follow up if symptoms worsen or fail to improve.

## 2020-05-12 DIAGNOSIS — J45.40 MODERATE PERSISTENT ASTHMA, UNSPECIFIED WHETHER COMPLICATED: ICD-10-CM

## 2020-05-14 ENCOUNTER — PATIENT MESSAGE (OUTPATIENT)
Dept: INTERNAL MEDICINE | Facility: CLINIC | Age: 30
End: 2020-05-14

## 2020-05-14 DIAGNOSIS — J45.40 MODERATE PERSISTENT ASTHMA, UNSPECIFIED WHETHER COMPLICATED: ICD-10-CM

## 2020-05-14 RX ORDER — DEXAMETHASONE 4 MG/1
TABLET ORAL
Qty: 36 G | Refills: 4 | OUTPATIENT
Start: 2020-05-14

## 2020-05-14 RX ORDER — DEXAMETHASONE 4 MG/1
TABLET ORAL
Qty: 36 G | Refills: 4 | Status: SHIPPED | OUTPATIENT
Start: 2020-05-14 | End: 2020-05-14 | Stop reason: SDUPTHER

## 2020-05-14 NOTE — TELEPHONE ENCOUNTER
Hi, my recent request to refill my prescription of Flovent was accidentally sent to [facility], I would like [facility]!  They are a lot cheaper Ive found.  Thank you so much, and yall stay safe.

## 2020-05-15 RX ORDER — DEXAMETHASONE 4 MG/1
TABLET ORAL
Qty: 36 G | Refills: 4 | Status: SHIPPED | OUTPATIENT
Start: 2020-05-15 | End: 2021-07-07

## 2020-12-10 ENCOUNTER — OFFICE VISIT (OUTPATIENT)
Dept: INTERNAL MEDICINE | Facility: CLINIC | Age: 30
End: 2020-12-10
Payer: OTHER GOVERNMENT

## 2020-12-10 VITALS
DIASTOLIC BLOOD PRESSURE: 78 MMHG | BODY MASS INDEX: 24.77 KG/M2 | OXYGEN SATURATION: 98 % | WEIGHT: 167.25 LBS | HEIGHT: 69 IN | HEART RATE: 76 BPM | SYSTOLIC BLOOD PRESSURE: 132 MMHG

## 2020-12-10 DIAGNOSIS — Z00.00 ROUTINE GENERAL MEDICAL EXAMINATION AT A HEALTH CARE FACILITY: Primary | ICD-10-CM

## 2020-12-10 DIAGNOSIS — J45.40 MODERATE PERSISTENT ASTHMA, UNSPECIFIED WHETHER COMPLICATED: ICD-10-CM

## 2020-12-10 PROCEDURE — 99395 PREV VISIT EST AGE 18-39: CPT | Mod: S$PBB,,, | Performed by: INTERNAL MEDICINE

## 2020-12-10 PROCEDURE — 99213 OFFICE O/P EST LOW 20 MIN: CPT | Mod: PBBFAC,PN,25 | Performed by: INTERNAL MEDICINE

## 2020-12-10 PROCEDURE — 99395 PR PREVENTIVE VISIT,EST,18-39: ICD-10-PCS | Mod: S$PBB,,, | Performed by: INTERNAL MEDICINE

## 2020-12-10 PROCEDURE — 99999 PR PBB SHADOW E&M-EST. PATIENT-LVL III: ICD-10-PCS | Mod: PBBFAC,,, | Performed by: INTERNAL MEDICINE

## 2020-12-10 PROCEDURE — 90471 IMMUNIZATION ADMIN: CPT | Mod: PBBFAC,PN

## 2020-12-10 PROCEDURE — 99999 PR PBB SHADOW E&M-EST. PATIENT-LVL III: CPT | Mod: PBBFAC,,, | Performed by: INTERNAL MEDICINE

## 2020-12-10 NOTE — PROGRESS NOTES
Subjective:       Patient ID: Aria Jacobo is a 30 y.o. male.    Chief Complaint: Asthma ( form for DR fill out )    HPI  Wellness check.  Chart reviewed.  Weight up 11 lbs/ 6 mo.  No asthma flares, no use of rescue inhaler in months.  No complaints.  Review of Systems   Constitutional: Negative for activity change and unexpected weight change.   HENT: Negative for hearing loss, rhinorrhea and trouble swallowing.    Eyes: Negative for discharge and visual disturbance.   Respiratory: Negative for chest tightness and wheezing.    Cardiovascular: Negative for chest pain and palpitations.   Gastrointestinal: Negative for blood in stool, constipation, diarrhea and vomiting.   Endocrine: Negative for polydipsia and polyuria.   Genitourinary: Negative for difficulty urinating, hematuria and urgency.   Musculoskeletal: Negative for arthralgias, joint swelling and neck pain.   Neurological: Negative for weakness and headaches.   Psychiatric/Behavioral: Negative for confusion and dysphoric mood.   All other systems reviewed and are negative.      Objective:      Physical Exam  Vitals signs reviewed.   Constitutional:       General: He is not in acute distress.     Appearance: He is well-developed and normal weight.   HENT:      Head: Normocephalic.      Mouth/Throat:      Mouth: Mucous membranes are moist.   Eyes:      General: No scleral icterus.     Extraocular Movements: Extraocular movements intact.      Conjunctiva/sclera: Conjunctivae normal.   Neck:      Musculoskeletal: Normal range of motion.   Cardiovascular:      Rate and Rhythm: Normal rate and regular rhythm.      Pulses: Normal pulses.      Heart sounds: Normal heart sounds.   Pulmonary:      Effort: Pulmonary effort is normal.      Breath sounds: Normal breath sounds. No wheezing.   Abdominal:      General: Abdomen is flat. Bowel sounds are normal. There is no distension.      Palpations: Abdomen is soft.   Musculoskeletal: Normal range of motion.       Right lower leg: No edema.      Left lower leg: No edema.   Skin:     General: Skin is warm and dry.   Neurological:      General: No focal deficit present.      Mental Status: He is alert.   Psychiatric:         Mood and Affect: Mood normal.         Assessment:       1. Routine general medical examination at a health care facility    2. Moderate persistent asthma, unspecified whether complicated        Plan:       Aria was seen today for asthma.    Diagnoses and all orders for this visit:    Routine general medical examination at a health care facility  -     CBC Auto Differential; Future  -     Comprehensive Metabolic Panel; Future  -     Lipid Panel; Future    Moderate persistent asthma, unspecified whether complicated  -     Pneumococcal Polysaccharide Vaccine (23 Valent) (SQ/IM)      Follow up if symptoms worsen or fail to improve.

## 2020-12-10 NOTE — LETTER
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  He has moderately severe asthma well controlled on his maintenance inhaled steroids and hasn't had to use his rescue inhaler in months,  He has mo physical or mental illnesses of any kind that would preclude him from any conceivable deployment.        If you have any questions or concerns, please don't hesitate to call.    Sincerely,        Manan Carrasquillo MD

## 2020-12-14 ENCOUNTER — LAB VISIT (OUTPATIENT)
Dept: LAB | Facility: HOSPITAL | Age: 30
End: 2020-12-14
Attending: INTERNAL MEDICINE
Payer: OTHER GOVERNMENT

## 2020-12-14 DIAGNOSIS — Z00.00 ROUTINE GENERAL MEDICAL EXAMINATION AT A HEALTH CARE FACILITY: ICD-10-CM

## 2020-12-14 LAB
ALBUMIN SERPL BCP-MCNC: 4.4 G/DL (ref 3.5–5.2)
ALP SERPL-CCNC: 56 U/L (ref 38–126)
ALT SERPL W/O P-5'-P-CCNC: 19 U/L (ref 10–44)
ANION GAP SERPL CALC-SCNC: 7 MMOL/L (ref 8–16)
AST SERPL-CCNC: 32 U/L (ref 15–46)
BASOPHILS # BLD AUTO: 0.04 K/UL (ref 0–0.2)
BASOPHILS NFR BLD: 0.9 % (ref 0–1.9)
BILIRUB SERPL-MCNC: 0.4 MG/DL (ref 0.1–1)
CALCIUM SERPL-MCNC: 9.4 MG/DL (ref 8.7–10.5)
CHLORIDE SERPL-SCNC: 106 MMOL/L (ref 95–110)
CHOLEST SERPL-MCNC: 185 MG/DL (ref 120–199)
CHOLEST/HDLC SERPL: 3.1 {RATIO} (ref 2–5)
CO2 SERPL-SCNC: 30 MMOL/L (ref 23–29)
CREAT SERPL-MCNC: 0.98 MG/DL (ref 0.5–1.4)
DIFFERENTIAL METHOD: ABNORMAL
EOSINOPHIL # BLD AUTO: 0.5 K/UL (ref 0–0.5)
EOSINOPHIL NFR BLD: 11.3 % (ref 0–8)
ERYTHROCYTE [DISTWIDTH] IN BLOOD BY AUTOMATED COUNT: 12.1 % (ref 11.5–14.5)
EST. GFR  (AFRICAN AMERICAN): >60 ML/MIN/1.73 M^2
EST. GFR  (NON AFRICAN AMERICAN): >60 ML/MIN/1.73 M^2
GLUCOSE SERPL-MCNC: 93 MG/DL (ref 70–110)
HCT VFR BLD AUTO: 39.9 % (ref 40–54)
HDLC SERPL-MCNC: 59 MG/DL (ref 40–75)
HDLC SERPL: 31.9 % (ref 20–50)
HGB BLD-MCNC: 12.7 G/DL (ref 14–18)
IMM GRANULOCYTES # BLD AUTO: 0.03 K/UL (ref 0–0.04)
IMM GRANULOCYTES NFR BLD AUTO: 0.6 % (ref 0–0.5)
LDLC SERPL CALC-MCNC: 113.4 MG/DL (ref 63–159)
LYMPHOCYTES # BLD AUTO: 1.5 K/UL (ref 1–4.8)
LYMPHOCYTES NFR BLD: 31.6 % (ref 18–48)
MCH RBC QN AUTO: 29.7 PG (ref 27–31)
MCHC RBC AUTO-ENTMCNC: 31.8 G/DL (ref 32–36)
MCV RBC AUTO: 93 FL (ref 82–98)
MONOCYTES # BLD AUTO: 0.5 K/UL (ref 0.3–1)
MONOCYTES NFR BLD: 11.3 % (ref 4–15)
NEUTROPHILS # BLD AUTO: 2.1 K/UL (ref 1.8–7.7)
NEUTROPHILS NFR BLD: 44.3 % (ref 38–73)
NONHDLC SERPL-MCNC: 126 MG/DL
NRBC BLD-RTO: 0 /100 WBC
PLATELET # BLD AUTO: 249 K/UL (ref 150–350)
PMV BLD AUTO: 9.8 FL (ref 9.2–12.9)
POTASSIUM SERPL-SCNC: 4.7 MMOL/L (ref 3.5–5.1)
PROT SERPL-MCNC: 7.4 G/DL (ref 6–8.4)
RBC # BLD AUTO: 4.27 M/UL (ref 4.6–6.2)
SODIUM SERPL-SCNC: 143 MMOL/L (ref 136–145)
TRIGL SERPL-MCNC: 63 MG/DL (ref 30–150)
UUN UR-MCNC: 23 MG/DL (ref 2–20)
WBC # BLD AUTO: 4.62 K/UL (ref 3.9–12.7)

## 2020-12-14 PROCEDURE — 80061 LIPID PANEL: CPT

## 2020-12-14 PROCEDURE — 85025 COMPLETE CBC W/AUTO DIFF WBC: CPT | Mod: PO

## 2020-12-14 PROCEDURE — 36415 COLL VENOUS BLD VENIPUNCTURE: CPT | Mod: PO

## 2020-12-14 PROCEDURE — 80053 COMPREHEN METABOLIC PANEL: CPT | Mod: PO

## 2021-05-10 ENCOUNTER — PATIENT MESSAGE (OUTPATIENT)
Dept: RESEARCH | Facility: HOSPITAL | Age: 31
End: 2021-05-10

## 2022-07-19 DIAGNOSIS — J30.81 ALLERGIC RHINITIS DUE TO ANIMAL HAIR AND DANDER: ICD-10-CM

## 2022-07-20 RX ORDER — FLUTICASONE PROPIONATE 50 MCG
SPRAY, SUSPENSION (ML) NASAL
Qty: 48 G | Refills: 3 | OUTPATIENT
Start: 2022-07-20

## 2022-07-26 DIAGNOSIS — J30.81 ALLERGIC RHINITIS DUE TO ANIMAL HAIR AND DANDER: ICD-10-CM

## 2022-07-27 RX ORDER — FLUTICASONE PROPIONATE 50 MCG
SPRAY, SUSPENSION (ML) NASAL
Qty: 48 G | Refills: 3 | OUTPATIENT
Start: 2022-07-27

## 2022-07-29 DIAGNOSIS — J30.81 ALLERGIC RHINITIS DUE TO ANIMAL HAIR AND DANDER: ICD-10-CM

## 2022-08-01 RX ORDER — FLUTICASONE PROPIONATE 50 MCG
SPRAY, SUSPENSION (ML) NASAL
Qty: 48 G | Refills: 3 | OUTPATIENT
Start: 2022-08-01

## 2023-04-25 ENCOUNTER — OFFICE VISIT (OUTPATIENT)
Dept: FAMILY MEDICINE | Facility: CLINIC | Age: 33
End: 2023-04-25
Payer: OTHER GOVERNMENT

## 2023-04-25 DIAGNOSIS — J45.40 MODERATE PERSISTENT ASTHMA, UNSPECIFIED WHETHER COMPLICATED: Primary | ICD-10-CM

## 2023-04-25 PROCEDURE — 99213 OFFICE O/P EST LOW 20 MIN: CPT | Mod: 95,,, | Performed by: NURSE PRACTITIONER

## 2023-04-25 PROCEDURE — 99213 PR OFFICE/OUTPT VISIT, EST, LEVL III, 20-29 MIN: ICD-10-PCS | Mod: 95,,, | Performed by: NURSE PRACTITIONER

## 2023-04-25 RX ORDER — LEVOCETIRIZINE DIHYDROCHLORIDE 5 MG/1
5 TABLET, FILM COATED ORAL NIGHTLY
Qty: 90 TABLET | Refills: 3 | Status: SHIPPED | OUTPATIENT
Start: 2023-04-25 | End: 2024-04-24

## 2023-04-25 RX ORDER — DEXAMETHASONE 4 MG/1
TABLET ORAL
Qty: 36 G | Refills: 5 | Status: SHIPPED | OUTPATIENT
Start: 2023-04-25

## 2023-04-25 RX ORDER — ALBUTEROL SULFATE 90 UG/1
2 AEROSOL, METERED RESPIRATORY (INHALATION) EVERY 6 HOURS PRN
Qty: 17 G | Refills: 6 | Status: SHIPPED | OUTPATIENT
Start: 2023-04-25 | End: 2023-05-02 | Stop reason: ALTCHOICE

## 2023-04-25 NOTE — PROGRESS NOTES
Subjective:       Patient ID: Aria Jacobo is a 32 y.o. male.    Chief Complaint: No chief complaint on file.  The patient location is: [place]   The chief complaint leading to consultation is: medication refill    Visit type: audiovisual    Face to Face time with patient: 10  10 minutes of total time spent on the encounter, which includes face to face time and non-face to face time preparing to see the patient (eg, review of tests), Obtaining and/or reviewing separately obtained history, Documenting clinical information in the electronic or other health record, Independently interpreting results (not separately reported) and communicating results to the patient/family/caregiver, or Care coordination (not separately reported).         Each patient to whom he or she provides medical services by telemedicine is:  (1) informed of the relationship between the physician and patient and the respective role of any other health care provider with respect to management of the patient; and (2) notified that he or she may decline to receive medical services by telemedicine and may withdraw from such care at any time.    Notes: pt reports to clinic with chief complaint of medication refill on asthma medication.  Previous PCP has retired.  Triggered by allergens.  No recent ER visits or hospitalizations.      Past Medical History:   Diagnosis Date    Asthma       Current Outpatient Medications on File Prior to Visit   Medication Sig Dispense Refill    aspirin 500 MG EC tablet Take 500 mg by mouth every 6 (six) hours as needed for Pain.      diphenhydrAMINE (SOMINEX) 25 mg tablet Take 25 mg by mouth nightly as needed for Insomnia.      fluticasone propionate (FLONASE) 50 mcg/actuation nasal spray USE 2 SPRAYS IN EACH NOSTRIL DAILY 48 g 3    [DISCONTINUED] FLOVENT  mcg/actuation inhaler USE 2 TO 3 INHALATIONS TWICE A DAY 36 g 5    [DISCONTINUED] methylPREDNISolone (MEDROL DOSEPACK) 4 mg tablet use as directed  (Patient not taking: Reported on 12/10/2020) 1 Package 0    [DISCONTINUED] PROAIR HFA 90 mcg/actuation inhaler USE 2 INHALATIONS EVERY 6 HOURS AS NEEDED FOR WHEEZING 17 g 6     No current facility-administered medications on file prior to visit.       HPI  Review of Systems   Constitutional:  Negative for activity change and unexpected weight change.   HENT:  Negative for hearing loss, rhinorrhea and trouble swallowing.    Eyes:  Negative for discharge and visual disturbance.   Respiratory:  Positive for wheezing. Negative for chest tightness.    Cardiovascular:  Negative for chest pain and palpitations.   Gastrointestinal:  Negative for blood in stool, constipation, diarrhea and vomiting.   Endocrine: Negative for polydipsia and polyuria.   Genitourinary:  Negative for difficulty urinating, hematuria and urgency.   Musculoskeletal:  Negative for arthralgias, joint swelling and neck pain.   Neurological:  Negative for weakness and headaches.   Psychiatric/Behavioral:  Negative for confusion and dysphoric mood.      Objective:      Physical Exam  HENT:      Head: Normocephalic.      Right Ear: External ear normal.      Left Ear: External ear normal.      Mouth/Throat:      Mouth: Mucous membranes are dry.   Eyes:      Extraocular Movements: Extraocular movements intact.   Pulmonary:      Effort: Pulmonary effort is normal. No respiratory distress (speaks without panting).   Skin:     Coloration: Skin is not jaundiced.   Neurological:      Mental Status: He is alert and oriented to person, place, and time.   Psychiatric:         Behavior: Behavior normal.       Assessment:       1. Moderate persistent asthma with status asthmaticus    2. Moderate persistent asthma, unspecified whether complicated        Plan:   Moderate persistent asthma with status asthmaticus    Moderate persistent asthma, unspecified whether complicated  -     PROAIR HFA 90 mcg/actuation inhaler; Inhale 2 puffs into the lungs every 6 (six) hours as  needed for Wheezing or Shortness of Breath. Rescue  Dispense: 17 g; Refill: 6  -     FLOVENT  mcg/actuation inhaler; USE 2 TO 3 INHALATIONS TWICE A DAY  Dispense: 36 g; Refill: 5    Other orders  -     levocetirizine (XYZAL) 5 MG tablet; Take 1 tablet (5 mg total) by mouth every evening.  Dispense: 90 tablet; Refill: 3      No follow-ups on file.

## 2023-05-01 ENCOUNTER — PATIENT MESSAGE (OUTPATIENT)
Dept: FAMILY MEDICINE | Facility: CLINIC | Age: 33
End: 2023-05-01
Payer: OTHER GOVERNMENT

## 2023-05-02 RX ORDER — ALBUTEROL SULFATE 90 UG/1
2 AEROSOL, METERED RESPIRATORY (INHALATION) EVERY 6 HOURS PRN
Qty: 18 G | Refills: 6 | Status: SHIPPED | OUTPATIENT
Start: 2023-05-02 | End: 2024-05-01

## 2024-10-31 RX ORDER — ALBUTEROL SULFATE 90 UG/1
INHALANT RESPIRATORY (INHALATION)
Qty: 17 G | Refills: 6 | Status: SHIPPED | OUTPATIENT
Start: 2024-10-31

## 2024-12-12 ENCOUNTER — PATIENT MESSAGE (OUTPATIENT)
Dept: RESEARCH | Facility: HOSPITAL | Age: 34
End: 2024-12-12
Payer: OTHER GOVERNMENT